# Patient Record
Sex: MALE | Race: WHITE | Employment: UNEMPLOYED | URBAN - METROPOLITAN AREA
[De-identification: names, ages, dates, MRNs, and addresses within clinical notes are randomized per-mention and may not be internally consistent; named-entity substitution may affect disease eponyms.]

---
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
## 2021-04-11 NOTE — PROGRESS NOTES
[facility]  [facility]  Dept: [phone]  Dept Fax: [phone]  ALIYA Liu is a 24 y.o.male    Pt presents for annual wellness physical exam.      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 Pt not currently working at this time. 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  Pt studying for exam at this time. Pt doing well at this time- denies any new complaints. Wt Readings from Last 3 Encounters:   04/15/21 216 lb 12.8 oz (98.3 kg)   10/19/18 214 lb (97.1 kg) (96 %, Z= 1.81)*   06/06/18 210 lb (95.3 kg) (96 %, Z= 1.76)*     * Growth percentiles are based on CDC (Boys, 2-20 Years) data. Pt stable since last visit- no new problems for diagnoses listed below:  Patient Active Problem List   Diagnosis   (none) - all problems resolved or deleted     Review of Systems   Constitutional: Negative for chills, diaphoresis, fatigue, fever and unexpected weight change. Eyes: Negative for visual disturbance. Respiratory: Negative for chest tightness and shortness of breath. Cardiovascular: Negative for chest pain, palpitations and leg swelling. Gastrointestinal: Negative for abdominal pain, anal bleeding, blood in stool, constipation, diarrhea, nausea and vomiting. Genitourinary: Negative for dysuria and hematuria. Musculoskeletal: Negative for neck pain. Neurological: Negative for dizziness, light-headedness and headaches.      OBJECTIVE     /72 (Site: Left Upper Arm, Position: Sitting, Cuff Size: Large Adult)   Pulse 95   Temp 96.3 °F (35.7 °C) (Tympanic)   Ht 6' 1\" (1.854 m)   Wt 216 lb 12.8 oz (98.3 kg)   SpO2 98% Comment: room air  BMI 28.60 kg/m²     Wt Readings from Last 3 Encounters:   04/15/21 216 lb 12.8 oz (98.3 kg)   10/19/18 214 lb (97.1 kg) (96 %, Z= 1.81)*   06/06/18 210 lb (95.3 kg) (96 %, Z= 1.76)*     * Growth percentiles are based on CDC (Boys, 2-20 Years) data.     Physical Exam  Vitals signs and nursing note reviewed. Exam conducted with a chaperone present. Constitutional:       General: He is not in acute distress. Appearance: Normal appearance. He is not ill-appearing, toxic-appearing or diaphoretic. HENT:      Head: Normocephalic and atraumatic. Right Ear: External ear normal.      Left Ear: External ear normal.      Nose: Nose normal. No rhinorrhea. Mouth/Throat:      Mouth: Mucous membranes are moist.      Pharynx: Oropharynx is clear. Eyes:      General: No scleral icterus. Right eye: No discharge. Left eye: No discharge. Extraocular Movements: Extraocular movements intact. Conjunctiva/sclera: Conjunctivae normal.      Pupils: Pupils are equal, round, and reactive to light. Neck:      Musculoskeletal: Normal range of motion. Cardiovascular:      Rate and Rhythm: Normal rate and regular rhythm. Heart sounds: Normal heart sounds. No murmur. No friction rub. No gallop. Pulmonary:      Effort: Pulmonary effort is normal. No respiratory distress. Breath sounds: Normal breath sounds. No stridor. No wheezing, rhonchi or rales. Chest:      Chest wall: No tenderness. Abdominal:      General: Abdomen is flat. Bowel sounds are normal. There is no distension. Palpations: Abdomen is soft. There is no mass. Tenderness: There is no abdominal tenderness. There is no guarding or rebound. Hernia: No hernia is present. Musculoskeletal: Normal range of motion. General: No swelling, deformity or signs of injury. Skin:     General: Skin is warm and dry. Coloration: Skin is not jaundiced or pale. Findings: No bruising, erythema, lesion or rash. Neurological:      General: No focal deficit present. Mental Status: He is alert and oriented to person, place, and time. Mental status is at baseline. Motor: No weakness.       Coordination: Coordination normal.      Gait: Gait normal.   Psychiatric:         Mood and Affect: Mood normal.         Behavior: Behavior normal.         Thought Content: Thought content normal.         Judgment: Judgment normal.       Lab Results   Component Value Date     11/15/2016    K 4.0 11/15/2016     11/15/2016    CO2 20 (L) 11/15/2016    BUN 14 11/15/2016    CREATININE 0.9 11/15/2016    GLUCOSE 89 11/15/2016    CALCIUM 10.1 11/15/2016    PROT 7.5 11/15/2016    LABALBU 4.5 11/15/2016    BILITOT 0.3 11/15/2016    ALKPHOS 130 (H) 11/15/2016    AST 17 11/15/2016    ALT 15 11/15/2016     Lab Results   Component Value Date    TSH 1.580 11/15/2016    T4FREE 1.09 11/15/2016       Lab Results   Component Value Date    WBC 6.3 11/15/2016    HGB 15.6 11/15/2016    HCT 46.9 11/15/2016    MCV 87.5 11/15/2016     11/15/2016       Immunization History   Administered Date(s) Administered    DTaP 2000, 2000, 2000, 10/19/2001, 09/07/2006    Hepatitis A 09/07/2006    Hepatitis B 2000, 2000, 2000    Hib, unspecified 2000, 2000, 2000, 02/26/2001    MMR 02/26/2001, 09/08/2006    Meningococcal MCV4P (Menactra) 04/13/2016    Polio IPV (IPOL) 2000, 2000, 2000, 09/08/2006    Tdap (Boostrix, Adacel) 08/10/2012    Varicella (Varivax) 02/26/2001, 08/28/2013       Health Maintenance   Topic Date Due    COVID-19 Vaccine (1) 10/15/2021 (Originally 2/10/2016)    HPV vaccine (2 - Male 3-dose series) 04/15/2022 (Originally 5/11/2016)    Hepatitis C screen  04/15/2022 (Originally 2000)    Flu vaccine (Season Ended) 09/01/2021    Hepatitis B vaccine  Completed    Hib vaccine  Completed    Varicella vaccine  Completed    Meningococcal (ACWY) vaccine  Completed    Hepatitis A vaccine  Addressed    HIV screen  Addressed    Pneumococcal 0-64 years Vaccine  Aged Out     Sleep Medicine referral indicated at this time (Obesity, Snoring, Daytime Somnolence, Apneic Episodes)?  Pt denies any current symptoms. No future appointments. ASSESSMENT       Diagnosis Orders   1. Well adult exam     2. Laboratory exam ordered as part of routine general medical examination  CBC Auto Differential    Comprehensive Metabolic Panel    T4, Free    TSH without Reflex   3. Screening for diabetes mellitus  Comprehensive Metabolic Panel     PLAN      After discussion with patient, will hold on referral to hematology at this time (due to father's early death due to hemolytic anemia)- pt declines at this time. Check CBC, CMP, and free T4/TSH at this time. Continue to work on diet, exercise, and weight loss for optimal cardiovascular health  Follow up in 12 months. Preventive Health Topics:  Pt declines HEP C screening at this time due to lack of risk factors. Encouraged COVID VACCINE- pt declines at this time. Encouraged annual FLU VACCINE after October 1st- pt declines. Encouraged HPV vaccine series- pt declines at this time.         Electronically signed Garrett Rome MD on 4/15/2021 at 10:26 AM

## 2021-04-15 ENCOUNTER — OFFICE VISIT (OUTPATIENT)
Dept: FAMILY MEDICINE CLINIC | Age: 21
End: 2021-04-15
Payer: COMMERCIAL

## 2021-04-15 VITALS
WEIGHT: 216.8 LBS | HEART RATE: 95 BPM | SYSTOLIC BLOOD PRESSURE: 110 MMHG | DIASTOLIC BLOOD PRESSURE: 72 MMHG | OXYGEN SATURATION: 98 % | TEMPERATURE: 96.3 F | BODY MASS INDEX: 28.73 KG/M2 | HEIGHT: 73 IN

## 2021-04-15 DIAGNOSIS — Z00.00 LABORATORY EXAM ORDERED AS PART OF ROUTINE GENERAL MEDICAL EXAMINATION: ICD-10-CM

## 2021-04-15 DIAGNOSIS — Z13.1 SCREENING FOR DIABETES MELLITUS: ICD-10-CM

## 2021-04-15 DIAGNOSIS — Z00.00 WELL ADULT EXAM: Primary | ICD-10-CM

## 2021-04-15 PROCEDURE — 99395 PREV VISIT EST AGE 18-39: CPT | Performed by: FAMILY MEDICINE

## 2021-04-15 ASSESSMENT — ENCOUNTER SYMPTOMS
VOMITING: 0
BLOOD IN STOOL: 0
SHORTNESS OF BREATH: 0
CHEST TIGHTNESS: 0
ABDOMINAL PAIN: 0
CONSTIPATION: 0
DIARRHEA: 0
NAUSEA: 0
ANAL BLEEDING: 0

## 2021-04-15 NOTE — PATIENT INSTRUCTIONS
After discussion with patient, will hold on referral to hematology at this time (due to father's early death due to hemolytic anemia)- pt declines at this time. Check CBC, CMP, and free T4/TSH at this time. Continue to work on diet, exercise, and weight loss for optimal cardiovascular health  Follow up in 12 months. Preventive Health Topics:  Pt declines HEP C screening at this time due to lack of risk factors. Encouraged COVID VACCINE- pt declines at this time. Encouraged annual FLU VACCINE after October 1st- pt declines. Encouraged HPV vaccine series- pt declines at this time.

## 2022-01-18 ENCOUNTER — NURSE TRIAGE (OUTPATIENT)
Dept: OTHER | Facility: CLINIC | Age: 22
End: 2022-01-18

## 2022-01-18 NOTE — TELEPHONE ENCOUNTER
Received call from Wu Merritt at [facility] with [employer]. Mother is the caller, who is with patient. Declines triage only wants order for covid PCR. Per Socorro at [facility] ([facility]), contacted vaccine line and it states to contact PCP. Caller already sent PCP a message and will await a call back.                 Reason for Disposition   Caller has already spoken with the PCP (or office), and has no further questions    Protocols used: NO CONTACT OR DUPLICATE CONTACT CALL-ADULT-[place]

## 2022-01-19 ENCOUNTER — TELEPHONE (OUTPATIENT)
Dept: FAMILY MEDICINE CLINIC | Age: 22
End: 2022-01-19

## 2022-01-19 DIAGNOSIS — R51.9 NONINTRACTABLE HEADACHE, UNSPECIFIED CHRONICITY PATTERN, UNSPECIFIED HEADACHE TYPE: ICD-10-CM

## 2022-01-19 DIAGNOSIS — R09.81 NASAL CONGESTION: Primary | ICD-10-CM

## 2022-01-19 DIAGNOSIS — R11.0 NAUSEA: ICD-10-CM

## 2022-01-19 DIAGNOSIS — R53.83 FATIGUE, UNSPECIFIED TYPE: ICD-10-CM

## 2022-01-19 NOTE — TELEPHONE ENCOUNTER
Pts mother works at [address] [place] [address] and she said that they are doing outpatient PCR COVID testing there. TS notified of this and is agreeable. Order placed in [facility] Manny Ponce notified.

## 2022-01-19 NOTE — TELEPHONE ENCOUNTER
has instructed us not to send patients for outpatient testing for covid unless this has changed. They will order a send out instead of a rapid if not evaluated there due to low supply. Would need appt here on eval in [facility]. Suzanne.  Thanks, TS

## 2022-01-31 ENCOUNTER — TELEPHONE (OUTPATIENT)
Dept: FAMILY MEDICINE CLINIC | Age: 22
End: 2022-01-31

## 2022-01-31 NOTE — TELEPHONE ENCOUNTER
LM for pt to call regarding rescheduling apt Monday 4/18/2022 with Nash Ha or Carlos Vick. Dr. Robb Atkins will not be in the office on Monday.

## 2022-02-14 ENCOUNTER — VIRTUAL VISIT (OUTPATIENT)
Dept: FAMILY MEDICINE CLINIC | Age: 22
End: 2022-02-14
Payer: COMMERCIAL

## 2022-02-14 DIAGNOSIS — J06.9 VIRAL URI: Primary | ICD-10-CM

## 2022-02-14 DIAGNOSIS — R05.9 COUGH: ICD-10-CM

## 2022-02-14 PROCEDURE — 99213 OFFICE O/P EST LOW 20 MIN: CPT | Performed by: NURSE PRACTITIONER

## 2022-02-14 RX ORDER — DEXTROMETHORPHAN HYDROBROMIDE AND PROMETHAZINE HYDROCHLORIDE 15; 6.25 MG/5ML; MG/5ML
5 SYRUP ORAL 4 TIMES DAILY PRN
Qty: 180 ML | Refills: 0 | Status: SHIPPED | OUTPATIENT
Start: 2022-02-14 | End: 2022-02-23

## 2022-02-14 ASSESSMENT — ENCOUNTER SYMPTOMS
CHEST TIGHTNESS: 0
ABDOMINAL PAIN: 0
BLOOD IN STOOL: 0
EYES NEGATIVE: 1
COUGH: 1
SHORTNESS OF BREATH: 1
NAUSEA: 1

## 2022-02-14 NOTE — LETTER
[id number] [address] [place] [address]  Phone: [phone]  Fax: [phone]    KAREN Robbins CNP        February 14, 2022     Patient: Benji Gan   YOB: 2000   Date of Visit: 2/14/2022       To Whom it May Concern:    Sushant Wernerummer was seen in my clinic on 2/14/2022. He may return to work on 2/16/22. If you have any questions or concerns, please don't hesitate to call.     Sincerely,         KAREN Robbins CNP

## 2022-02-14 NOTE — PROGRESS NOTES
[facility]  [facility]  Dept: [phone]  Dept Fax: [phone]      TELEHEALTH EVALUATION -- Audio/Visual (During KJSEJ-90 public health emergency)     Juan Calvo, was evaluated through a synchronous (real-time) audio-video encounter. The patient (or guardian if applicable) is aware that this is a billable service, which includes applicable co-pays. This Virtual Visit was conducted with patient's (and/or legal guardian's) consent. The visit was conducted pursuant to the emergency declaration under the [address] [place] [address], [address] [place] [address] authority and the [employer] Act. Patient identification was verified, and a caregiver was present when appropriate. The patient was located in a state where the provider was licensed to provide care. Meenu Maurer is a 25 y.o. male    Pt presents for c/o fever for last 2-3 days. Also notes nausea, dizziness, sinus congestion, cough, mild body aches, sob. Home covid test was negative. Pt has tried otc cold and flu medication without relief. Patient Active Problem List   Diagnosis   (none) - all problems resolved or deleted       Current Outpatient Medications   Medication Sig Dispense Refill    promethazine-dextromethorphan (PROMETHAZINE-DM) 6.25-15 MG/5ML syrup Take 5 mLs by mouth 4 times daily as needed for Cough 180 mL 0     No current facility-administered medications for this visit. Review of Systems   Constitutional: Positive for activity change, appetite change, fatigue and fever. Negative for chills and unexpected weight change. HENT: Positive for congestion. Eyes: Negative. Respiratory: Positive for cough and shortness of breath. Negative for chest tightness. Cardiovascular: Negative for chest pain, palpitations and leg swelling. Gastrointestinal: Positive for nausea.  Negative for abdominal pain and blood in stool. Genitourinary: Negative for dysuria. Musculoskeletal: Positive for myalgias. Negative for joint swelling. Skin: Negative for rash. Neurological: Positive for dizziness. Psychiatric/Behavioral: Negative. All other systems reviewed and are negative. OBJECTIVE     Due to this being a TeleHealth encounter, evaluation of the following organ systems is limited: Vitals/Constitutional/EENT/Resp/CV/GI//MS/Neuro/Skin/Heme-Lymph-Imm. PHYSICAL EXAMINATION:  [ INSTRUCTIONS:  \"[x]\" Indicates a positive item  \"[]\" Indicates a negative item  -- DELETE ALL ITEMS NOT EXAMINED]  Vital Signs: (All Vital Signs are pt reported, unless otherwise noted)   There were no vitals taken for this visit. Constitutional: [] Appears well-developed and well-nourished [x] No apparent distress      [] Abnormal-   Mental status  [x] Alert and awake  [x] Oriented to person/place/time [x]Able to follow commands      Eyes:  EOM    [x]  Normal  [] Abnormal-  Sclera  [x]  Normal  [] Abnormal -         Discharge [x]  None visible  [] Abnormal -    HENT:   [x] Normocephalic, atraumatic.   [] Abnormal   [x] Mouth/Throat: Mucous membranes are moist.     External Ears [x] Normal  [] Abnormal-     Neck: [x] No visualized mass     Pulmonary/Chest: [x] Respiratory effort normal.  [x] No visualized signs of difficulty breathing or respiratory distress        [] Abnormal-      Musculoskeletal:   [x] Normal gait with no signs of ataxia         [x] Normal range of motion of neck        [] Abnormal-       Neurological:        [x] No Facial Asymmetry (Cranial nerve 7 motor function) (limited exam to video visit)          [x] No gaze palsy        [] Abnormal-         Skin:        [x] No significant exanthematous lesions or discoloration noted on facial skin         [] Abnormal-            Psychiatric:       [x] Normal Affect [x] No Hallucinations        [] Abnormal-         No results found for: LABA1C  No results found for: EAG    No results found for: CHOL, TRIG, HDL, LDLCALC, LDLDIRECT    Lab Results   Component Value Date     11/15/2016    K 4.0 11/15/2016     11/15/2016    CO2 20 (L) 11/15/2016    BUN 14 11/15/2016    CREATININE 0.9 11/15/2016    GLUCOSE 89 11/15/2016    CALCIUM 10.1 11/15/2016    PROT 7.5 11/15/2016    LABALBU 4.5 11/15/2016    BILITOT 0.3 11/15/2016    ALKPHOS 130 (H) 11/15/2016    AST 17 11/15/2016    ALT 15 11/15/2016       No results found for: LABMICR, [id number]    Lab Results   Component Value Date    TSH 1.580 11/15/2016    T4FREE 1.09 11/15/2016       Lab Results   Component Value Date    WBC 6.3 11/15/2016    HGB 15.6 11/15/2016    HCT 46.9 11/15/2016    MCV 87.5 11/15/2016     11/15/2016       No results found for: PSA, PSADIA      Immunization History   Administered Date(s) Administered    DTaP 2000, 2000, 2000, 10/19/2001, 09/07/2006    Hepatitis A 09/07/2006    Hepatitis B 2000, 2000, 2000    Hib, unspecified 2000, 2000, 2000, 02/26/2001    MMR 02/26/2001, 09/08/2006    Meningococcal MCV4P (Menactra) 04/13/2016    Polio IPV (IPOL) 2000, 2000, 2000, 09/08/2006    Tdap (Boostrix, Adacel) 08/10/2012    Varicella (Varivax) 02/26/2001, 08/28/2013       Health Maintenance   Topic Date Due    COVID-19 Vaccine (1) Never done    Depression Screen  Never done    Flu vaccine (1) Never done    HPV vaccine (2 - Male 3-dose series) 04/15/2022 (Originally 5/11/2016)    Hepatitis C screen  04/15/2022 (Originally 2000)    Hepatitis B vaccine  Completed    Hib vaccine  Completed    Varicella vaccine  Completed    Meningococcal (ACWY) vaccine  Completed    Hepatitis A vaccine  Addressed    HIV screen  Addressed    Pneumococcal 0-64 years Vaccine  Aged Out         No future appointments. ASSESSMENT       Diagnosis Orders   1. Viral URI     2.  Cough         PLAN     Viral nature of symptoms discussed  Symptomatic Care  Increase fluids and rest  RTO if symptoms worsen or stay the same  [email or web address]  19}    Pursuant to the emergency declaration under the [address] [place] [address], [facility] waiver authority and the [employer] Act, this Virtual  Visit was conducted, with patient's consent, to reduce the patient's risk of exposure to COVID-19 and provide continuity of care for an established patient. Services were provided through a video synchronous discussion virtually to substitute for in-person clinic visit. Electronically signed by KAREN Carvalho CNP on 2/14/2022 at 1:32 PM    Greater than 20 minutes was spent in contact with patient with greater than 50% in counseling and coordination of care.

## 2022-02-14 NOTE — PATIENT INSTRUCTIONS
Patient Education        Viral Respiratory Infection: Care Instructions  Your Care Instructions     Viruses are very small organisms. They grow in number after they enter your body. There are many types that cause different illnesses, such as colds and the mumps. The symptoms of a viral respiratory infection often start quickly. They include a fever, sore throat, and runny nose. You may also just not feel well. Or you may not want to eat much. Most viral respiratory infections are not serious. They usually get better with time and self-care. Antibiotics are not used to treat a viral infection. That's because antibiotics will not help cure a viral illness. In some cases, antiviral medicine can help your body fight a serious viral infection. Follow-up care is a key part of your treatment and safety. Be sure to make and go to all appointments, and call your doctor if you are having problems. It's also a good idea to know your test results and keep a list of the medicines you take. How can you care for yourself at home? · Rest as much as possible until you feel better. · Be safe with medicines. Take your medicine exactly as prescribed. Call your doctor if you think you are having a problem with your medicine. You will get more details on the specific medicine your doctor prescribes. · Take an over-the-counter pain medicine, such as acetaminophen (Tylenol), ibuprofen (Advil, Motrin), or naproxen (Aleve), as needed for pain and fever. Read and follow all instructions on the label. Do not give aspirin to anyone younger than 20. It has been linked to Reye syndrome, a serious illness. · Drink plenty of fluids. Hot fluids, such as tea or soup, may help relieve congestion in your nose and throat. If you have kidney, heart, or liver disease and have to limit fluids, talk with your doctor before you increase the amount of fluids you drink.   · Try to clear mucus from your lungs by breathing deeply and coughing. · Gargle with warm salt water once an hour. This can help reduce swelling and throat pain. Use 1 teaspoon of salt mixed in 1 cup of warm water. · Do not smoke or allow others to smoke around you. If you need help quitting, talk to your doctor about stop-smoking programs and medicines. These can increase your chances of quitting for good. To avoid spreading the virus  · Cough or sneeze into a tissue. Then throw the tissue away. · If you don't have a tissue, use your hand to cover your cough or sneeze. Then clean your hand. You can also cough into your sleeve. · Wash your hands often. Use soap and warm water. Wash for 15 to 20 seconds each time. · If you don't have soap and water near you, you can clean your hands with alcohol wipes or gel. When should you call for help? Call your doctor now or seek immediate medical care if:    · You have a new or higher fever.     · Your fever lasts more than 48 hours.     · You have trouble breathing.     · You have a fever with a stiff neck or a severe headache.     · You are sensitive to light.     · You feel very sleepy or confused. Watch closely for changes in your health, and be sure to contact your doctor if:    · You do not get better as expected. Where can you learn more? Go to [email or web address] org and sign in to your [employer] account. Enter H637 in the [facility] box to learn more about \"Viral Respiratory Infection: Care Instructions. \"     If you do not have an account, please click on the \"Sign Up Now\" link. Current as of: July 6, 2021               Content Version: 13.1  © 4181-2359 [employer], [employer]. Care instructions adapted under license by [facility] ([facility]). If you have questions about a medical condition or this instruction, always ask your healthcare professional. [address] any warranty or liability for your use of this information.